# Patient Record
Sex: FEMALE | Employment: PART TIME | URBAN - METROPOLITAN AREA
[De-identification: names, ages, dates, MRNs, and addresses within clinical notes are randomized per-mention and may not be internally consistent; named-entity substitution may affect disease eponyms.]

---

## 2020-11-15 ENCOUNTER — OFFICE VISIT (OUTPATIENT)
Dept: FAMILY MEDICINE | Facility: CLINIC | Age: 25
End: 2020-11-15

## 2020-11-15 VITALS
OXYGEN SATURATION: 97 % | DIASTOLIC BLOOD PRESSURE: 84 MMHG | WEIGHT: 140 LBS | TEMPERATURE: 98.6 F | HEART RATE: 111 BPM | SYSTOLIC BLOOD PRESSURE: 116 MMHG | HEIGHT: 62 IN | BODY MASS INDEX: 25.76 KG/M2

## 2020-11-15 DIAGNOSIS — T78.40XA ALLERGIC REACTION, INITIAL ENCOUNTER: Primary | ICD-10-CM

## 2020-11-15 PROCEDURE — 99204 OFFICE O/P NEW MOD 45 MIN: CPT | Mod: 25 | Performed by: NURSE PRACTITIONER

## 2020-11-15 PROCEDURE — 96372 THER/PROPH/DIAG INJ SC/IM: CPT | Performed by: NURSE PRACTITIONER

## 2020-11-15 RX ORDER — EPINEPHRINE 0.3 MG/.3ML
0.3 INJECTION SUBCUTANEOUS ONCE
Status: COMPLETED | OUTPATIENT
Start: 2020-11-15 | End: 2020-11-15

## 2020-11-15 RX ORDER — PREDNISONE 20 MG/1
TABLET ORAL
Qty: 21 TABLET | Refills: 0 | Status: SHIPPED | OUTPATIENT
Start: 2020-11-15

## 2020-11-15 RX ADMIN — EPINEPHRINE 0.3 MG: 0.3 INJECTION SUBCUTANEOUS at 15:31

## 2020-11-15 ASSESSMENT — ENCOUNTER SYMPTOMS
PSYCHIATRIC NEGATIVE: 1
EYES NEGATIVE: 1
HEMATOLOGIC/LYMPHATIC NEGATIVE: 1
CONSTITUTIONAL NEGATIVE: 1
NEUROLOGICAL NEGATIVE: 1
MUSCULOSKELETAL NEGATIVE: 1
CARDIOVASCULAR NEGATIVE: 1
GASTROINTESTINAL NEGATIVE: 1
RESPIRATORY NEGATIVE: 1

## 2020-11-15 ASSESSMENT — MIFFLIN-ST. JEOR: SCORE: 1323.29

## 2020-11-15 NOTE — PATIENT INSTRUCTIONS
Please call your primary care provider when you get home and we recommend you see an allergist to find out what is causing the problem.        Patient Education     General Allergic Reactions  An allergic reaction is a set of symptoms caused by an allergen. An allergen is something that causes your immune system to react abnormally. It releases various chemicals. These include histamine. Histamine causes swelling and itching. An allergic reaction may affect the entire body. This is called a general allergic reaction. Often symptoms affect only one part of the body. This is called a local allergic reaction.   You are having an allergic reaction. Almost anything can cause one. Different people are allergic to different things. It is usually something that you ate or swallowed, came into contact with by getting or putting it on your skin or clothes, or something you breathed in the air. This can be very annoying and sometimes scary.   Most people think of allergic reactions when they have a rash or itchy skin. Other symptoms can include:     Itching of the eyes, nose, and roof of the mouth    Runny or stuffy nose    Watery eyes     Sneezing or coughing     A blocked feeling in the ear    Red, raised, itchy rash called hives    Red and purple spots    Rash, redness, welts, blisters    Itching, burning, stinging, pain    Dry, flaky, cracking, scaly skin  Severe symptoms include:    Swelling of the face, lips, or other parts of the body    Hoarse voice    Trouble swallowing, feeling like your throat is closing    Trouble breathing, wheezing    Nausea, vomiting, diarrhea, stomach cramps    Feeling faint or lightheaded, rapid heart rate  Sometimes the cause may be obvious. But there are so many things that can cause a reaction that you may not be able to figure it out. The most important things to help find your allergen are to remember:     When it started    What you were doing at the time or just before that    What  activities you were involved in    If you were exposed to anything new  Below are some common causes of allergies. Some of these can cause severe general allergic reactions. Others can cause mild to moderate symptoms. But remember that almost anything can cause a reaction. You may not even be aware that you came into contact with one of these things:     Dust, mold, pollen    Plants (common ones are poison ivy and poison oak, but there are many others)     Animals    Foods such as shrimp, shellfish, peanuts, milk products, gluten, and eggs    Food colorings, flavorings, and additives    Insect bites or stings such as bees, mosquitoes, fleas, and ticks    Medicines such as penicillin, sulfa medicines, aspirin, and ibuprofen. But any medicine can cause a reaction.    Jewelry such as nickel or gold. This can be new, or something you ve worn for a while, including zippers and buttons.    Latex such as in gloves, clothes, toys, balloons, or some tapes. Some people allergic to latex may also have problems with foods like bananas, avocados, kiwi, papaya, or chestnuts.    Lotions, perfumes, cosmetics, soaps, shampoos, skincare products, nail products    Chemicals or dyes in clothing, linen, , hair dyes, soaps, iodine  Many viruses and common colds can cause a rash that is not an allergic reaction. Sometimes it is hard to tell the difference between allergies, sensitivity, or an intolerance to something. This is especially true with food. Many things can cause diarrhea, vomiting, stomach cramps, and skin irritation.   Home care    The goal of treatment is to help relieve the symptoms and get you feeling better. The rash will usually fade over several days. But it can sometimes last a couple of weeks. Over the next couple of days, there may be times when it gets a little worse, and then better again. Here are some things to do:     If you know what you are allergic to, stay away from it. Future exposures may cause  similar or sometimes worse symptoms.    Don't wear tight clothing and stay away from anything that heats up your skin such as hot showers or baths, and direct sunlight. Heat will make itching worse.    An ice pack will relieve local areas of intense itching and redness. To make an ice pack, put ice cubes in a plastic bag that seals at the top. Wrap it in a thin, clean towel. Don t put the ice directly on the skin because it can damage the skin.    Oral diphenhydramine is an over-the-counter antihistamine sold at pharmacies and grocery stores. Unless a prescription antihistamine was given, diphenhydramine may be used to reduce itching if large areas of the skin are involved. It may make you sleepy. So be careful using it in the daytime or when going to school, working, or driving. Note: Don t use diphenhydramine if you have glaucoma or if you are a man with trouble urinating because of an enlarged prostate. There are other antihistamines that won t make you so sleepy. These are good choices for daytime use. Ask your healthcare provider or pharmacist for suggestions.    Don t use diphenhydramine cream on your skin unless prescribed. It may cause a worse reaction in some people.    To help prevent an infection, don't scratch the affected area. Scratching may worsen the reaction and damage your skin. It can also lead to an infection. Always check the affected areas for signs of an infection.    Call your healthcare provider and ask what you can use to help decrease the itching.    To decrease your exposure to allergens, try the following:    ? Use heat-steam to clean your home.  ? Use high-efficiency particulate (HEPA) vacuums and filters.  ? Stay away from food and pet triggers.  ? Kill any cockroaches and use pest control to keep further infestations from happening.  ? Clean your house often.  Follow-up care  Follow up with your healthcare provider, or as advised. If you had a severe reaction today, or if you have had  several mild to medium allergic reactions in the past, ask your provider about allergy testing. This can help you find out what you are allergic to. If you had a severe reaction that included dizziness, fainting, or trouble breathing or swallowing, ask your provider about carrying auto-injectable epinephrine.   Call 911  Call 911 if any of these occur:     Trouble breathing or swallowing, wheezing    Cool, moist, pale skin    Shortness of breath    Hoarse voice or trouble speaking    Confusion     Very drowsy or trouble awakening    Fainting or loss of consciousness    Rapid heart rate    Feeling of dizziness or weakness or a sudden drop in blood pressure    Feeling of doom    Feeling lightheaded    Severe nausea or vomiting, or diarrhea    Seizure    Swelling in the face, eyelids, lips, mouth, throat, or tongue    Drooling  When to seek medical advice  Call your healthcare provider or get medical care right away if any of these occur:     Spreading areas of itching, redness, or swelling    Nausea or stomach cramps or abdominal pain    Continuing or recurring symptoms    Spreading areas of redness, swelling, or itching    Signs of infection at the affected site:  ? Spreading redness  ? Increased pain or swelling  ? Fluid or colored drainage from the site  ? Fever of 100.4 F (38 C) or above lasting for 24 to 48 hours, or as directed by your provider  [employer] last reviewed this educational content on 10/1/2019    6808-8578 [employer]. [address] [place] [address], [place] [address]. All rights reserved. This information is not intended as a substitute for professional medical care. Always follow your healthcare professional's instructions.

## 2020-11-15 NOTE — PROGRESS NOTES
"SUBJECTIVE:   Leela Butler is a 25 year old female presenting with a chief complaint of   Chief Complaint   Patient presents with     Allergic Reaction       She is a new patient of [facility].  She was here at the [employer] shopping with her  and child when she suddenly developed intense itching all over and hives.  states her face looked swollen. [place] [facility] [place] and [place] Benadryl and she took 75mg po. The drug store sent her to the clinic.     Upon arrival she denies any chest pain or SOB. C/o intense itching all over, even in the genitals. They had gone out to eat 2 hours prior to this happening and had eggs and hash browns. She has no known food allergies she is aware of. Has allergy to PCN but has not been on any medications.   This happened to her once before 2 months ago at home and they had to call 911 and was given IV benadryl and steroids.   Has not seen an allergist for this issue.  She presents to the clinic ambulatory and is intensely itching her palms and wrists. Would later take off shoes and itch the soles of her feet.  Denies swelling of the lips, tongue or throat but states she feels flushed.        Review of Systems   Constitutional: Negative.    Eyes: Negative.    Respiratory: Negative.    Cardiovascular: Negative.    Gastrointestinal: Negative.    Genitourinary: Negative.    Musculoskeletal: Negative.    Skin: Positive for rash.        Hives, itching and face feels \"hot\"   Neurological: Negative.    Hematological: Negative.    Psychiatric/Behavioral: Negative.        No past medical history on file.  No family history on file.  No current outpatient medications on file.     Social History     Tobacco Use     Smoking status: Never Smoker     Smokeless tobacco: Never Used   Substance Use Topics     Alcohol use: Not on file       OBJECTIVE  /84   Pulse 111   Temp 98.6  F (37  C) (Oral)   Ht 1.575 m (5' 2\")   Wt 63.5 kg (140 lb)   SpO2 97%   BMI 25.61 kg/m  "     Physical Exam  Constitutional:       General: She is not in acute distress.     Appearance: Normal appearance.   HENT:      Head: Normocephalic and atraumatic.      Mouth/Throat:      Mouth: Mucous membranes are moist.      Pharynx: Oropharynx is clear. No posterior oropharyngeal erythema.      Comments: No swelling oropharynx  Able to speak in complete sentences without issue.   Eyes:      Conjunctiva/sclera: Conjunctivae normal.      Pupils: Pupils are equal, round, and reactive to light.   Neck:      Musculoskeletal: Normal range of motion and neck supple. No neck rigidity.   Cardiovascular:      Rate and Rhythm: Normal rate and regular rhythm.      Heart sounds: Normal heart sounds.   Pulmonary:      Effort: Pulmonary effort is normal. No respiratory distress.      Breath sounds: Normal breath sounds. No stridor. No wheezing.   Musculoskeletal: Normal range of motion.   Skin:     General: Skin is warm and dry.      Findings: Rash present.      Comments: Palms and soles of feet red. Hives to posterior trunk and neck. No hives on face or lips.    Neurological:      General: No focal deficit present.      Mental Status: She is alert and oriented to person, place, and time.      Motor: No weakness.      Gait: Gait normal.   Psychiatric:         Mood and Affect: Mood normal.         Thought Content: Thought content normal.       Leela was seen today for allergic reaction.    Diagnoses and all orders for this visit:    Allergic reaction, initial encounter  -     EPINEPHrine (ANY BX GENERIC EQUIV) injection 0.3 mg    Leela was monitored for 45 minutes after Epi was given. She had stopped itching, felt much better and flushing had disappeared, although flushing was not greatly appreciated prior to Epi administration.    She denies any chest pain, SOB, difficulty swallowing, swelling of the lips, tongue or throat.   She was given a prescription for prednisone with instructions to start this medication with supper  tonight. This was given from our pharmacy in clinic.   They will be returning to the home in [facility] and and will notify their PCP tomorrow.   Information about allergic reactions given in [facility].